# Patient Record
Sex: FEMALE | Race: WHITE | NOT HISPANIC OR LATINO | URBAN - METROPOLITAN AREA
[De-identification: names, ages, dates, MRNs, and addresses within clinical notes are randomized per-mention and may not be internally consistent; named-entity substitution may affect disease eponyms.]

---

## 2023-07-06 ENCOUNTER — OFFICE (OUTPATIENT)
Dept: URBAN - METROPOLITAN AREA CLINIC 16 | Facility: CLINIC | Age: 63
End: 2023-07-06
Payer: COMMERCIAL

## 2023-07-06 VITALS
OXYGEN SATURATION: 96 % | HEART RATE: 105 BPM | SYSTOLIC BLOOD PRESSURE: 136 MMHG | HEIGHT: 68 IN | WEIGHT: 185 LBS | DIASTOLIC BLOOD PRESSURE: 82 MMHG

## 2023-07-06 DIAGNOSIS — K59.00 CONSTIPATION, UNSPECIFIED: ICD-10-CM

## 2023-07-06 DIAGNOSIS — K62.89 OTHER SPECIFIED DISEASES OF ANUS AND RECTUM: ICD-10-CM

## 2023-07-06 DIAGNOSIS — Z83.71 FAMILY HISTORY OF COLONIC POLYPS: ICD-10-CM

## 2023-07-06 DIAGNOSIS — K57.90 DIVERTICULOSIS OF INTESTINE, PART UNSPECIFIED, WITHOUT PERFO: ICD-10-CM

## 2023-07-06 DIAGNOSIS — Z86.010 PERSONAL HISTORY OF COLONIC POLYPS: ICD-10-CM

## 2023-07-06 DIAGNOSIS — K62.5 HEMORRHAGE OF ANUS AND RECTUM: ICD-10-CM

## 2023-07-06 DIAGNOSIS — K64.9 UNSPECIFIED HEMORRHOIDS: ICD-10-CM

## 2023-07-06 PROCEDURE — 99244 OFF/OP CNSLTJ NEW/EST MOD 40: CPT | Performed by: INTERNAL MEDICINE

## 2023-07-06 NOTE — SERVICENOTES
this he has sciatica and SI joint issues and chronic constipation that is exacerbated by having several issues and has prolapsed hemorrhoids over time and was recommended to have surgery.  I will have her see Francisco Almodovar to see if he can do banding accordingly.  We are sending Proctofoam she is a patient of Dr. Howard Blair and will follow-up for colonoscopy and history of polyps as well

## 2023-07-06 NOTE — SERVICEHPINOTES
Sara Colbert   is a   62   year old   female   patient who is seen   at the request of   Govind Teran   for a consultation/initial visit. Sara had a history of multiple colon polyps with Dr. Blair in December 2022 with 4 polyps that were adenomatous and even a rectal polyp regardless she has a history of prolapsed hemorrhoids and was recommended to see surgery for hemorrhoidectomy. She was hoping that she could get something sort of hemorrhoidectomy and does note that she has rectal prolapse and internal and external hemorrhoids I did discuss that she would need a specialist in this area to have appropriate advisement in regards accordingly and I will send her to Francisco Almodovar from proctology. She otherwise could see surgery accordingly  
br
br  She has  longstanding constipation and bloating and uses Miralax daily 
br
brShe had benign physical exam but she does have rectal exam and digital examination and she does have prolapsed hemorrhoids grade 3/4